# Patient Record
Sex: FEMALE | Race: WHITE
[De-identification: names, ages, dates, MRNs, and addresses within clinical notes are randomized per-mention and may not be internally consistent; named-entity substitution may affect disease eponyms.]

---

## 2017-01-05 ENCOUNTER — HOSPITAL ENCOUNTER (OUTPATIENT)
Dept: HOSPITAL 80 - FIMAGING | Age: 52
End: 2017-01-05
Attending: GENERAL ACUTE CARE HOSPITAL
Payer: COMMERCIAL

## 2017-01-05 DIAGNOSIS — Z12.31: Primary | ICD-10-CM

## 2017-01-05 PROCEDURE — G0202 SCR MAMMO BI INCL CAD: HCPCS

## 2017-01-05 NOTE — MA
Screening Digital Mammogram with Digital Breast Tomosynthesis

 

Clinical Indications: Routine screening.

 

Technique:  Standard cephalocaudal projections are obtained. Digital breast tomosynthesis was perform
ed in the MLO projection with reconstruction at 1.0 mm slice thickness and composite MLO views recons
tructed. This examination is processed by the CAD computer aided detection system.

 

Comparison: November 25, 2011; October 11, 2010; and studies dating back to August 27, 2008.

 

Breast density: B; There are scattered areas of fibroglandular density.

 

Findings: CAD was reviewed. 

 

There are no new masses, new clusters of microcalcifications, or significant axillary lymphadenopathy
.

 

Impression: Negative mammogram. BI-RADS 1.

 

Recommendation:  Routine screening mammogram is recommended in one year.

 

[facility], [facility] will send a result letter to the patient.

 

Negative mammography should not preclude additional workup of a clinically suspicious finding.

 

The patient's information is entered into a reminder system with a target due date for her next mammo
gram.

## 2019-01-28 ENCOUNTER — HOSPITAL ENCOUNTER (OUTPATIENT)
Dept: HOSPITAL 80 - FIMAGING | Age: 54
End: 2019-01-28
Attending: FAMILY MEDICINE
Payer: COMMERCIAL

## 2019-01-28 DIAGNOSIS — Z12.31: Primary | ICD-10-CM

## 2019-01-28 DIAGNOSIS — Z80.3: ICD-10-CM
